# Patient Record
(demographics unavailable — no encounter records)

---

## 2025-02-05 NOTE — ASSESSMENT
[FreeTextEntry1] : (1) HCM - discussed diet, exercise, weight maintenance. Labs drawn in office as below, including HIV/Hep C screening (patient works as an oral surgeon).  Patient doesn't believe he ever got the Hepatitis B vaccine.  In previous HBV serologies, HBsAb was positive, and Hep B total core was negative (suggesting vaccine immunity).  Stool guaiac performed by MD today and was negative.  He declines the influenza vaccination. He wishes to take the Shingrix at a later date, when he knows, he doesn't have to work the next day.  I advised patient that if he switches to Medicare, he will need to get the vaccine at a pharmacy.  Patient reports a colonoscopy from 2019 with Dr. Lieberman, and 5-year follow-up was recommended.  As he has ulcerative colitis, timing of the colonoscopies will be as per his gastroenterologist.  Patient reports having a written Health Care Proxy and/or Living Will and will forward a copy.  (2) CV - BP under control on amlodipine currently. Patient reports that his BP is often slightly elevated in MD offices. Patient followed w cardiologist Dr Arizmendi.  Echocardiogram and cardiac CTA normal in 2020.  (3) GI - follow-up with Dr. Lieberman for management of UC, which is under control with mesalamine.  (4) Endo - FNA of thyroid nodule was benign in 2023.  He has been going for follow-up ultrasounds and will have these forwarded to me.

## 2025-02-05 NOTE — PHYSICAL EXAM
[No Acute Distress] : no acute distress [Well Nourished] : well nourished [Well Developed] : well developed [Well-Appearing] : well-appearing [Normal Voice/Communication] : normal voice/communication [Normal Sclera/Conjunctiva] : normal sclera/conjunctiva [PERRL] : pupils equal round and reactive to light [EOMI] : extraocular movements intact [Normal Outer Ear/Nose] : the outer ears and nose were normal in appearance [Normal Oropharynx] : the oropharynx was normal [Normal TMs] : both tympanic membranes were normal [No JVD] : no jugular venous distention [No Lymphadenopathy] : no lymphadenopathy [Supple] : supple [Thyroid Normal, No Nodules] : the thyroid was normal and there were no nodules present [No Respiratory Distress] : no respiratory distress  [No Accessory Muscle Use] : no accessory muscle use [Clear to Auscultation] : lungs were clear to auscultation bilaterally [Normal Rate] : normal rate  [Regular Rhythm] : with a regular rhythm [Normal S1, S2] : normal S1 and S2 [No Murmur] : no murmur heard [No Carotid Bruits] : no carotid bruits [No Abdominal Bruit] : a ~M bruit was not heard ~T in the abdomen [No Varicosities] : no varicosities [Pedal Pulses Present] : the pedal pulses are present [No Edema] : there was no peripheral edema [No Palpable Aorta] : no palpable aorta [No Extremity Clubbing/Cyanosis] : no extremity clubbing/cyanosis [Soft] : abdomen soft [Non Tender] : non-tender [Non-distended] : non-distended [No Masses] : no abdominal mass palpated [No HSM] : no HSM [Normal Bowel Sounds] : normal bowel sounds [No Hernias] : no hernias [Normal Sphincter Tone] : normal sphincter tone [No Mass] : no mass [Urethral Meatus] : meatus normal [Penis Abnormality] : normal circumcised penis [Urinary Bladder Findings] : the bladder was normal on palpation [Scrotum] : the scrotum was normal [Testes Mass (___cm)] : there were no testicular masses [Prostate Enlargement] : the prostate was not enlarged [No Prostate Nodules] : no prostate nodules [Normal Posterior Cervical Nodes] : no posterior cervical lymphadenopathy [Normal Anterior Cervical Nodes] : no anterior cervical lymphadenopathy [No CVA Tenderness] : no CVA  tenderness [No Spinal Tenderness] : no spinal tenderness [No Joint Swelling] : no joint swelling [Grossly Normal Strength/Tone] : grossly normal strength/tone [No Rash] : no rash [Coordination Grossly Intact] : coordination grossly intact [No Focal Deficits] : no focal deficits [Normal Gait] : normal gait [Deep Tendon Reflexes (DTR)] : deep tendon reflexes were 2+ and symmetric [Normal Affect] : the affect was normal [Normal Insight/Judgement] : insight and judgment were intact [Stool Occult Blood] : stool negative for occult blood

## 2025-02-05 NOTE — HEALTH RISK ASSESSMENT
[Yes] : Yes [2 - 3 times a week (3 pts)] : 2 - 3  times a week (3 points) [Never (0 pts)] : Never (0 points) [No] : In the past 12 months have you used drugs other than those required for medical reasons? No [No falls in past year] : Patient reported no falls in the past year [0] : 2) Feeling down, depressed, or hopeless: Not at all (0) [PHQ-2 Negative - No further assessment needed] : PHQ-2 Negative - No further assessment needed [Never] : Never [Patient reported mammogram was normal] : Patient reported mammogram was normal [Patient reported colonoscopy was abnormal] : Patient reported colonoscopy was abnormal [HIV Test offered] : HIV Test offered [Hepatitis C test offered] : Hepatitis C test offered [None] : None [With Significant Other] : lives with significant other [Employed] : employed [] :  [Sexually Active] : sexually active [Feels Safe at Home] : Feels safe at home [Fully functional (bathing, dressing, toileting, transferring, walking, feeding)] : Fully functional (bathing, dressing, toileting, transferring, walking, feeding) [Fully functional (using the telephone, shopping, preparing meals, housekeeping, doing laundry, using] : Fully functional and needs no help or supervision to perform IADLs (using the telephone, shopping, preparing meals, housekeeping, doing laundry, using transportation, managing medications and managing finances) [Smoke Detector] : smoke detector [Carbon Monoxide Detector] : carbon monoxide detector [Seat Belt] :  uses seat belt [Sunscreen] : uses sunscreen [Designated Healthcare Proxy] : Designated healthcare proxy [Relationship: ___] : Relationship: [unfilled] [de-identified] : Treadmill 30min occasionally other day, golf 3x/week, and walking. [[id number]] : 0 [Change in mental status noted] : No change in mental status noted [Language] : denies difficulty with language [Behavior] : denies difficulty with behavior [Handling Complex Tasks] : denies difficulty handling complex tasks [Reasoning] : denies difficulty with reasoning [High Risk Behavior] : no high risk behavior [Reports changes in hearing] : Reports no changes in hearing [Reports changes in vision] : Reports no changes in vision [Reports changes in dental health] : Reports no changes in dental health [ColonoscopyDate] : 2019 [ColonoscopyComments] : Going every 5 years.  Next one 2025. [de-identified] : Dr. Ayaz Parham.  Contacts and glasses for distance and reading.  Seen in 2024. [de-identified] : Going regularly. [AdvancecareDate] : 02/05/2025 [FreeTextEntry4] : Patient reports having a written Health Care Proxy and/or Living Will and will forward a copy.

## 2025-02-05 NOTE — HISTORY OF PRESENT ILLNESS
[de-identified] : Patient is a 64-year-old male with a history of ulcerative colitis and is maintained on mesalamine and followed by Dr. Reginaldo Lieberman.  Patient feels well with no new concerns.  Patient follows-up with his cardiologist Dr. Arizmendi for management of hypertension.  He had a normal echocardiogram in 2019.  Patient had a normal cardiac CTA in December 2020 and a TTE in Dec 2022.  A carotid duplex was normal June 2023.  Patient also sees his Dermatologist twice a year.  He saw Dr. Ulises Lebron (Ortho) in Feb 2023 for shoulder pain and was diagnosed with adhesive capsulitis which resolved..  He saw Dr. Arvind Low (Ortho-hand) for Dupuytren's contracture.  He has some white coat effect on his BP readings.  He gets 118/79 (as an example) at home.  Patient had a thyroid US at [facility] (ordered by himself).  He found that the thyroid nodule grew and a subsequent FNA (done at [facility]) showed benign follicular nodule with cystic change.  He went for a repeat scan in Feb 2024.  He declines the influenza vaccination.  He declines further COVID-19 boosters.  He will return for the Shingrix at a later date (or have it done at a pharmacy as he will be switching to Medicare soon).  Patient had a mild COVID-19 infection in April 2022 and recovered.  Patient is  and lives with his spouse.  He works as an oral surgeon.

## 2025-06-12 NOTE — PHYSICAL EXAM
[de-identified] : Evaluation of right ring finger demonstrates a radial lateral sheath cord with a 35 degree MCP and 35 degree PIP contracture.  I can palpate the cord at the level of the proximal digital crease.  Diffuse palmar fibromatosis in the palm as well but no other sizable contractures on that hand but a positive tabletop test.  On the left diffuse Dupuytren's with a cord forming radial index finger and an MP contracture of 25 degrees of the ring finger

## 2025-06-12 NOTE — PHYSICAL EXAM
[de-identified] : Evaluation of right ring finger demonstrates a radial lateral sheath cord with a 35 degree MCP and 35 degree PIP contracture.  I can palpate the cord at the level of the proximal digital crease.  Diffuse palmar fibromatosis in the palm as well but no other sizable contractures on that hand but a positive tabletop test.  On the left diffuse Dupuytren's with a cord forming radial index finger and an MP contracture of 25 degrees of the ring finger

## 2025-06-12 NOTE — ASSESSMENT
[FreeTextEntry1] : Patient with Dupuytren's disease.  Has positive tabletop test.  Would be a good candidate for right ring finger PIP cord Xiaflex injection  The risks benefits and alternatives were discussed with the patient which included, but were not limited to infection, nerve injury, tendon ruptures, CRPS, recurrence, skin tears, allergic reaction, lymphadenopathy, need for additional surgery or treatment, pain etc.

## 2025-06-12 NOTE — HISTORY OF PRESENT ILLNESS
[FreeTextEntry1] : Patient presents for follow-up evaluation of right greater than left Dupuytren's contracture. He is here to discuss about getting a xiaflex injection.

## 2025-07-23 NOTE — HISTORY OF PRESENT ILLNESS
[de-identified] : Patient presents for follow-up of HTN, prediabetes.  He has been stable on amlodipine 10mg daily.  His BP is often elevated in MD offices.  He has prediabetes, and notes that he does have a lot of pasta in his diet.  His LDL has risen to the 150s.  His coronary calcium score was zero, and he is observed off medication.  Patient had colonoscopy a few months ago and it was stable (Dr. Reginaldo Lieberman).  His alk phos has been rising, and his ALT was very slightly elevated at 46 (upper cutoff 45).  No jaundice, abdominal pain.  He has tried to cut back on alcohol (formerly 9 drinks/week).  He generally feels well.

## 2025-07-23 NOTE — ASSESSMENT
[FreeTextEntry1] : (1) CV - patient has elevated BP in MD offices and has been 120s/80s at home.  Continue amlodipine.  He has an elevated LDL, but normal coronary calcium score.  He is followed by Cardiology, and maintained off a statin for now.  (2) GI - patient reports a recent normal colonoscopy.  The alk phos has been rising, and ALT was very slightly elevated.  He has cut back on EtOH.  Will repeat LFTs with a GGT.  Patient can follow-up with his gastroenterologist if these remain abnormal.  (3) Prediabetes - patient advised to cut back his carbohydrate portions (mainly pasta).  (4) Patient requested blood type today.  Labs drawn in office as below.